# Patient Record
Sex: MALE | Race: WHITE | URBAN - METROPOLITAN AREA
[De-identification: names, ages, dates, MRNs, and addresses within clinical notes are randomized per-mention and may not be internally consistent; named-entity substitution may affect disease eponyms.]

---

## 2022-02-18 ENCOUNTER — OFFICE VISIT (OUTPATIENT)
Dept: URBAN - METROPOLITAN AREA CLINIC 72 | Facility: CLINIC | Age: 57
End: 2022-02-18
Payer: COMMERCIAL

## 2022-02-18 DIAGNOSIS — H52.4 PRESBYOPIA: ICD-10-CM

## 2022-02-18 DIAGNOSIS — H40.053 OCULAR HYPERTENSION, BILATERAL: ICD-10-CM

## 2022-02-18 DIAGNOSIS — H25.813 COMBINED FORMS OF AGE-RELATED CATARACT, BILATERAL: Primary | ICD-10-CM

## 2022-02-18 PROCEDURE — 92004 COMPRE OPH EXAM NEW PT 1/>: CPT | Performed by: OPTOMETRIST

## 2022-02-18 PROCEDURE — 76514 ECHO EXAM OF EYE THICKNESS: CPT | Performed by: OPTOMETRIST

## 2022-02-18 PROCEDURE — 92133 CPTRZD OPH DX IMG PST SGM ON: CPT | Performed by: OPTOMETRIST

## 2022-02-18 ASSESSMENT — INTRAOCULAR PRESSURE
OS: 24
OD: 25

## 2022-02-18 ASSESSMENT — VISUAL ACUITY
OS: 20/25
OD: 20/25

## 2022-02-18 NOTE — IMPRESSION/PLAN
Impression: Ocular hypertension, bilateral: H40.053. IOP higher end of normal 
IOP 25 24 ON healthy Plan: Educated pt on findings Pt voiced understanding Pt is not on gtt therapy Do not recommend gtts at this time Will monitor for changes

## 2023-10-15 NOTE — IMPRESSION/PLAN
"  History     Chief Complaint   Patient presents with    Flank Pain     HPI  Neo Adams is a 65 year old male who presents with right flank pain.  I took over from Dr. Landry.  CT imaging has shown a small 4 mm ureteral stone at the junction of the bladder.  He will likely pass this.  Spoke briefly with urology on-call.  We will try hydration and Flomax at home.  Plus minus antibiotic, he does have an elevated white blood cell count but no fever and the rest of his urinalysis is not significantly remarkable.  However, if he will be delayed in seeing the urologist it would be reasonable to start him on an antibiotic.  Discussed plan of care at length.  See AVS.    Allergies:  No Known Allergies    Problem List:    Patient Active Problem List    Diagnosis Date Noted    FH: CAD (coronary artery disease) 05/29/2014     Priority: Medium    Nocturia 05/29/2014     Priority: Medium    Pure hypercholesterolemia 05/29/2014     Priority: Medium        Past Medical History:    No past medical history on file.    Past Surgical History:    Past Surgical History:   Procedure Laterality Date    INGROWN TOENAILS      LEFT KNEE ARTHROSCOPY      CLEANED OUT       Family History:    Family History   Problem Relation Age of Onset    Heart Disease Brother        Social History:  Marital Status:   [2]  Social History     Tobacco Use    Smoking status: Never    Smokeless tobacco: Never   Substance Use Topics    Alcohol use: No    Drug use: No        Medications:    ciprofloxacin (CIPRO) 500 MG tablet  tamsulosin (FLOMAX) 0.4 MG capsule  dutasteride (AVODART) 0.5 MG capsule  MELATONIN PO          Review of Systems    Physical Exam   BP: (!) 202/82  Pulse: 53  Temp: 97.9  F (36.6  C)  Resp: 18  Height: 177.8 cm (5' 10\")  Weight: 100.7 kg (222 lb)  SpO2: 99 %      Physical Exam    ED Course                 Procedures              Critical Care time:  none               Results for orders placed or performed during the hospital " Impression: Presbyopia: H52.4. Plan: New spec Rx given - Discussed changes and possible adaptation period. 

RTC 1 year/PRN encounter of 10/15/23 (from the past 24 hour(s))   [place] Draw    Narrative    The following orders were created for panel order [place] Draw.  Procedure                               Abnormality         Status                     ---------                               -----------         ------                     Extra Blue Top Tube[[id number]]                              Final result               Extra Red Top Tube[[id number]]                               Final result               Extra Green Top (Lithium...[[id number]]                      Final result               Extra Purple Top Tube[[id number]]                            Final result               Extra Green Top (Lithium...[[id number]]                      Final result                 Please view results for these tests on the individual orders.   Extra Blue Top Tube   Result Value Ref Range    Hold Specimen JIC    Extra Red Top Tube   Result Value Ref Range    Hold Specimen JIC    Extra Green Top (Lithium Heparin) Tube   Result Value Ref Range    Hold Specimen JIC    Extra Purple Top Tube   Result Value Ref Range    Hold Specimen JIC    Extra Green Top (Lithium Heparin) ON ICE   Result Value Ref Range    Hold Specimen JIC    CBC with platelets differential    Narrative    The following orders were created for panel order CBC with platelets differential.  Procedure                               Abnormality         Status                     ---------                               -----------         ------                     CBC with platelets and d...[[id number]]  Abnormal            Final result                 Please view results for these tests on the individual orders.   Comprehensive metabolic panel   Result Value Ref Range    Sodium 138 135 - 145 mmol/L    Potassium 4.0 3.4 - 5.3 mmol/L    Carbon Dioxide (CO2) 27 22 - 29 mmol/L    Anion Gap 10 7 - 15 mmol/L    Urea Nitrogen 17.5 8.0 - 23.0 mg/dL    Creatinine 1.26 (H) 0.67 - 1.17 mg/dL    GFR  Estimate 63 >60 mL/min/1.73m2    Calcium 9.8 8.8 - 10.2 mg/dL    Chloride 101 98 - 107 mmol/L    Glucose 130 (H) 70 - 99 mg/dL    Alkaline Phosphatase 100 40 - 129 U/L    AST 27 0 - 45 U/L    ALT 30 0 - 70 U/L    Protein Total 7.9 6.4 - 8.3 g/dL    Albumin 4.7 3.5 - 5.2 g/dL    Bilirubin Total 0.3 <=1.2 mg/dL   CBC with platelets and differential   Result Value Ref Range    WBC Count 11.8 (H) 4.0 - 11.0 10e3/uL    RBC Count 5.07 4.40 - 5.90 10e6/uL    Hemoglobin 14.6 13.3 - 17.7 g/dL    Hematocrit 42.5 40.0 - 53.0 %    MCV 84 78 - 100 fL    MCH 28.8 26.5 - 33.0 pg    MCHC 34.4 31.5 - 36.5 g/dL    RDW 13.1 10.0 - 15.0 %    Platelet Count 322 150 - 450 10e3/uL    % Neutrophils 88 %    % Lymphocytes 7 %    % Monocytes 5 %    Mids % (Monos, Eos, Basos)      % Eosinophils 0 %    % Basophils 0 %    % Immature Granulocytes 0 %    NRBCs per 100 WBC 0 <1 /100    Absolute Neutrophils 10.3 (H) 1.6 - 8.3 10e3/uL    Absolute Lymphocytes 0.8 0.8 - 5.3 10e3/uL    Absolute Monocytes 0.6 0.0 - 1.3 10e3/uL    Mids Abs (Monos, Eos, Basos)      Absolute Eosinophils 0.0 0.0 - 0.7 10e3/uL    Absolute Basophils 0.0 0.0 - 0.2 10e3/uL    Absolute Immature Granulocytes 0.0 <=0.4 10e3/uL    Absolute NRBCs 0.0 10e3/uL   UA with Microscopic reflex to Culture    Specimen: Urine, Midstream   Result Value Ref Range    Color Urine Dark Yellow (A) Colorless, Straw, Light Yellow, Yellow    Appearance Urine Clear Clear    Glucose Urine Negative Negative mg/dL    Bilirubin Urine Negative Negative    Ketones Urine Negative Negative mg/dL    Specific Gravity Urine 1.031 (H) 1.000 - 1.030    Blood Urine Negative Negative    pH Urine 6.0 5.0 - 9.0    Protein Albumin Urine 20 (A) Negative mg/dL    Urobilinogen Urine Normal Normal, 2.0 mg/dL    Nitrite Urine Negative Negative    Leukocyte Esterase Urine Negative Negative    Mucus Urine Present (A) None Seen /LPF    Amorphous Crystals Urine Few (A) None Seen /HPF    RBC Urine 4 (H) <=2 /HPF    WBC Urine 3 <=5  /HPF    Narrative    Urine Culture not indicated   CT Abdomen Pelvis w/o Contrast    Narrative    PROCEDURE:  CT ABDOMEN PELVIS W/O CONTRAST    HISTORY:  right flank pian    TECHNIQUE:  Helical CT of the abdomen and pelvis was performed without  intravenous contrast. This CT exam was performed using one or more the  following dose reduction techniques: automated exposure control,  adjustment of the mA and/or kV according to patient size, and/or  iterative reconstruction technique.    COMPARISON:  None.    FINDINGS:      Evaluation of the solid organs is somewhat limited due to the lack of  intravenous contrast.    Limited views through the lung bases show no focal consolidation or  intrapulmonary mass.    There is mild right hydroureteronephrosis and perinephric stranding  secondary to a 4 mm stone in the distal right ureter at the right  ureterovesicular junction. The stone appears partially within the  bladder. The prostate is enlarged. There is no left hydronephrosis. No  calculus is seen.    The liver is normal in size and attenuation. The gallbladder is  unremarkable. The spleen, pancreas and adrenal glands are  unremarkable. There is no evidence of hydronephrosis. There is no  abdominal aortic aneurysm.      The bowel is normal in caliber.    No suspicious osseous lesions are identified.      Impression    IMPRESSION:      Mild right hydroureteronephrosis secondary to a 4 mm stone at the  right ureterovesicular junction.    DONNA TORRES MD         SYSTEM ID:  [id number]       Medications   sodium chloride 0.9 % infusion (0 mLs Intravenous Stopped 10/15/23 1525)   ketorolac (TORADOL) injection 15 mg (15 mg Intravenous $Given 10/15/23 1302)   tamsulosin (FLOMAX) capsule 0.4 mg (0.4 mg Oral $Given 10/15/23 1522)       Assessments & Plan (with Medical Decision Making)     I have reviewed the nursing notes.    I have reviewed the findings, diagnosis, plan and need for follow up with the patient.            Medical Decision Making  The patient's presentation was of low complexity (an acute and uncomplicated illness or injury).    The patient's evaluation involved:  history and exam without other MDM data elements    The patient's management necessitated moderate risk (prescription drug management including medications given in the ED).        New Prescriptions    CIPROFLOXACIN (CIPRO) 500 MG TABLET    Take 1 tablet (500 mg) by mouth 2 times daily for 3 days    TAMSULOSIN (FLOMAX) 0.4 MG CAPSULE    Take 1 capsule (0.4 mg) by mouth daily for 10 doses       Final diagnoses:   Kidney stone       10/15/2023   [facility] AND [facility]       KatarzynaRosita greene DO  10/15/23 [id number]